# Patient Record
Sex: MALE | Race: WHITE
[De-identification: names, ages, dates, MRNs, and addresses within clinical notes are randomized per-mention and may not be internally consistent; named-entity substitution may affect disease eponyms.]

---

## 2018-11-09 ENCOUNTER — HOSPITAL ENCOUNTER (EMERGENCY)
Dept: HOSPITAL 25 - ED | Age: 43
Discharge: HOME | End: 2018-11-09
Payer: MEDICARE

## 2018-11-09 DIAGNOSIS — E78.00: ICD-10-CM

## 2018-11-09 DIAGNOSIS — L03.116: Primary | ICD-10-CM

## 2018-11-09 DIAGNOSIS — F41.9: ICD-10-CM

## 2018-11-09 DIAGNOSIS — Q90.9: ICD-10-CM

## 2018-11-09 LAB
BASOPHILS # BLD AUTO: 0 10^3/UL (ref 0–0.2)
EOSINOPHIL # BLD AUTO: 0 10^3/UL (ref 0–0.6)
HCT VFR BLD AUTO: 45 % (ref 42–52)
HGB BLD-MCNC: 15.4 G/DL (ref 14–18)
LYMPHOCYTES # BLD AUTO: 0.8 10^3/UL (ref 1–4.8)
MCH RBC QN AUTO: 33 PG (ref 27–31)
MCHC RBC AUTO-ENTMCNC: 34 G/DL (ref 31–36)
MCV RBC AUTO: 96 FL (ref 80–94)
MONOCYTES # BLD AUTO: 0.9 10^3/UL (ref 0–0.8)
NEUTROPHILS # BLD AUTO: 7.3 10^3/UL (ref 1.5–7.7)
NRBC # BLD AUTO: 0 10^3/UL
NRBC BLD QL AUTO: 0.1
PLATELET # BLD AUTO: 233 10^3/UL (ref 150–450)
RBC # BLD AUTO: 4.68 10^6/UL (ref 4–5.4)
URATE SERPL-MCNC: 8.3 MG/DL (ref 4.4–7.6)
WBC # BLD AUTO: 9.2 10^3/UL (ref 3.5–10.8)

## 2018-11-09 PROCEDURE — 36415 COLL VENOUS BLD VENIPUNCTURE: CPT

## 2018-11-09 PROCEDURE — 87040 BLOOD CULTURE FOR BACTERIA: CPT

## 2018-11-09 PROCEDURE — 96361 HYDRATE IV INFUSION ADD-ON: CPT

## 2018-11-09 PROCEDURE — 83605 ASSAY OF LACTIC ACID: CPT

## 2018-11-09 PROCEDURE — 99283 EMERGENCY DEPT VISIT LOW MDM: CPT

## 2018-11-09 PROCEDURE — 86140 C-REACTIVE PROTEIN: CPT

## 2018-11-09 PROCEDURE — 85025 COMPLETE CBC W/AUTO DIFF WBC: CPT

## 2018-11-09 PROCEDURE — 84550 ASSAY OF BLOOD/URIC ACID: CPT

## 2018-11-09 PROCEDURE — 96374 THER/PROPH/DIAG INJ IV PUSH: CPT

## 2018-11-09 PROCEDURE — 80053 COMPREHEN METABOLIC PANEL: CPT

## 2018-11-09 NOTE — ED
Lower Extremity





- HPI Summary


HPI Summary: 





A 42 y/o male brought in by ambulance presents to the ED c/o left leg pain. The 

pt had pain in his leg before and went to  in [place] where he had an x-ray 

and was told that he had a light sprain. He rates his pain as 4/10. On 11/9/ 2018 he presents to the ED because after getting off of his bus he had trouble 

ambulating, unable to bear weight, and claimed that the pain was all the way up 

to his hip. He denies fever. The pt has Down's syndrome and lives in a 

disability home. He came in to the ED with his left leg wrapped. Some history 

was obtained from a family member that accompanied the patient in the ED.





- History of Current Complaint


Chief Complaint: EDExtremityLower


Stated Complaint: LT LEG AND FOOT PAIN


Time Seen by Provider: 11/09/18 10:09


Hx Obtained From: Patient, Family/Caretaker


Mechanism Of Injury: Unknown


Onset of Pain: Prior to Arrival


Onset/Duration: Days


Severity Initially: Moderate


Severity Currently: Moderate


Pain Intensity: 4


Pain Scale Used: 0-10 Numeric


Timing: Constant


Location: Is Discrete @ - left leg


Able to Bear Weight: No





- Allergies/Home Medications


Allergies/Adverse Reactions: 


 Allergies











Allergy/AdvReac Type Severity Reaction Status Date / Time


 


No Known Allergies Allergy   Verified 11/09/18 10:15











Home Medications: 


 Home Medications





Clotrimazole 1% CREAM* 1 dose TOPICAL DAILY 11/09/18 [History Confirmed 11/09/18

]


Tylenol Pm Ex-Strength Caplet 500 mg PO BID 11/09/18 [History Confirmed 11/09/18

]











PMH/Surg Hx/FS Hx/Imm Hx


Endocrine/Hematology History: 


   Denies: Hx Diabetes


Cardiovascular History: Reports: Hx Hypercholesterolemia


Psychiatric History: Reports: Hx Anxiety - s


Infectious Disease History: No


Infectious Disease History: 


   Denies: Traveled Outside the [place] in Last 30 Days





- Family History


Known Family History: 


   Negative: Hypertension, Diabetes, Blood Disorder





- Social History


Alcohol Use: None


Substance Use Type: Reports: None


Smoking Status (MU): Never Smoked Tobacco





Review of Systems


Negative: Fever


Positive: Myalgia - left leg pain, radiating to hip


All Other Systems Reviewed And Are Negative: Yes





Physical Exam





- Summary


Physical Exam Summary: 





VITAL SIGNS: Reviewed.


GENERAL: Patient is a well-developed and nourished MALE who is lying 

comfortable in the stretcher. Patient is not in any acute respiratory distress.


HEAD AND FACE: Down's syndrome facial features, No signs of trauma. No 

ecchymosis, hematomas or skull depressions. No sinus tenderness.


EYES: PERRLA, EOMI x 2, No injected conjunctiva, no nystagmus.


EARS: Hearing grossly intact. Ear canals and tympanic membranes are within 

normal limits.


MOUTH: Oropharynx within normal limits.


NECK: Supple, trachea is midline, no adenopathy, no JVD, no carotid bruit, no c-

spine tenderness, neck with full ROM.


CHEST: Symmetric, no tenderness at palpation


LUNGS: Clear to auscultation bilaterally. No wheezing or crackles.


CVS: Regular rate and rhythm, S1 and S2 present, no murmurs or gallops 

appreciated.


ABDOMEN: Soft, non-tender. No signs of distention. No rebound no guarding, and 

no masses palpated. Bowel sounds are normal.


EXTREMITIES: Left ankle redness, swelling around the left lateral malleolus and 

medial malleolus, increase in temperature of left leg and TTP.


NEURO: Alert and oriented x 3. No acute neurological deficits. Speech is normal 

and follows commands.


SKIN: Dry and warm


Triage Information Reviewed: Yes


Vital Signs On Initial Exam: 


 Initial Vitals











Temp Pulse Resp BP Pulse Ox


 


 97.8 F   80   16   146/74   94 


 


 11/09/18 09:56  11/09/18 09:56  11/09/18 09:56  11/09/18 09:56  11/09/18 09:56











Vital Signs Reviewed: Yes





Diagnostics





- Vital Signs


 Vital Signs











  Temp Pulse Resp BP Pulse Ox


 


 11/09/18 09:56  97.8 F  80  16  146/74  94














- Laboratory


Result Diagrams: 


 11/09/18 10:40





 11/09/18 10:40


Lab Statement: Any lab studies that have been ordered have been reviewed, and 

results considered in the medical decision making process.





- Radiology


  ** ankle x-ray


Radiology Interpretation Completed By: Radiologist - Non diffuse ankle tissue 

swelling. ED physician has reviewed this report.





- Ultrasound


  ** No standard instances


Ultrasound Interpretation Completed By: Radiologist - VL Lower extremity veins 

left: no left lower extremity deep vein thrombosis.





Lower Extremity Course/Dx





- Course


Assessment/Plan: A 42 y/o male brought in by ambulance presents to the ED c/o 

left leg pain. The pt had pain in his leg before and went to  in [place] where 

he had an x-ray and was told that he had a light sprain. He rates his pain as 4/

10. On 11/9/2018 he presents to the ED because after getting off of his bus he 

had trouble ambulating, unable to bear weight, and claimed that the pain was 

all the way up to his hip. He denies fever. The pt has Down's syndrome and 

lives in a disability home. He came in to the ED with his left leg wrapped. 

Some history was obtained from a family member that accompanied the patient in 

the ED.  Blood work without any significant abnormality except for the C-

reactive protein of 168.32.  Left ankle x-ray impression: Nonspecific diffuse 

soft tissue swelling.  Left lower extremity ultrasound impression: No DVT.  

Therefore, I believe that the patient is having and left ankle cellulitis.  The 

patient was given Rocephin 1 g IV and he will be discharged home with a 

prescription for Keflex 500 mg 4 times a day for 10 days.  I have discussed the 

findings and test results with the patient and the caregiver and they agree.  I 

also recommended to return to the emergency department if he develops increase 

in swelling, increase in erythema, worsening symptoms.  They understand and 

agree.  At that point we might try IV antibiotics.  Patient is hemodynamically 

stable alert and oriented.





- Diagnoses


Differential Diagnosis/HQI/PQRI: Positive: Cellulitis, Contusion, Dislocation, 

Fracture (Closed), Sprain, Strain, Tendonitis


Provider Diagnoses: 


 Ankle cellulitis








Discharge





- Sign-Out/Discharge


Documenting (check all that apply): Patient Departure - DC





- Discharge Plan


Condition: Stable


Disposition: HOME


Prescriptions: 


Cephalexin CAP* [Keflex CAP*] 500 mg PO QID #40 cap


Patient Education Materials:  Cellulitis (ED)


Referrals: 


[facility] [place] [facility] [Outside] - 3 Days


Additional Instructions: 


Return to the Emergency Department if you experience any changing or worsening 

symptoms.





- Billing Disposition and Condition


Condition: STABLE


Disposition: Home





- Attestation Statements


Document Initiated by Scribe: Yes


Documenting Scribe: Mitch Dejesus


Provider For Whom Frantz is Documenting (Include Credential): Hira Heredia MD


Scribe Attestation: 


IMitch, scribed for Hira Heredia MD on 11/10/18 at 0828. 


Scribe Documentation Reviewed: Yes


Provider Attestation: 


The documentation as recorded by the scribe, Mitch Dejesus accurately 

reflects the service I personally performed and the decisions made by me, 

Hira Heredia MD





Attestations


User Type: Provider with Scribe


Provider Attestation: The documentation recorded by the scribe accurately 

reflects the service I personally performed and the decisions made by me.